# Patient Record
Sex: FEMALE | Race: BLACK OR AFRICAN AMERICAN | NOT HISPANIC OR LATINO | URBAN - METROPOLITAN AREA
[De-identification: names, ages, dates, MRNs, and addresses within clinical notes are randomized per-mention and may not be internally consistent; named-entity substitution may affect disease eponyms.]

---

## 2023-07-03 ENCOUNTER — TELEPHONE (OUTPATIENT)
Dept: PSYCHIATRY | Facility: CLINIC | Age: 28
End: 2023-07-03
Payer: COMMERCIAL

## 2023-07-03 NOTE — TELEPHONE ENCOUNTER
----- Message from Courtney Dey sent at 7/3/2023  4:05 PM CDT -----  Contact: Luis Storey is calling to speak with the department regarding scheduling new patient appointment. Please give a call back at .[phone] as requested,  Thanks  LR

## 2023-07-05 ENCOUNTER — OFFICE VISIT (OUTPATIENT)
Dept: INTERNAL MEDICINE | Facility: CLINIC | Age: 28
End: 2023-07-05
Payer: COMMERCIAL

## 2023-07-05 ENCOUNTER — LAB VISIT (OUTPATIENT)
Dept: LAB | Facility: HOSPITAL | Age: 28
End: 2023-07-05
Payer: COMMERCIAL

## 2023-07-05 VITALS
WEIGHT: 266.75 LBS | TEMPERATURE: 98 F | HEIGHT: 63 IN | BODY MASS INDEX: 47.27 KG/M2 | DIASTOLIC BLOOD PRESSURE: 80 MMHG | HEART RATE: 79 BPM | OXYGEN SATURATION: 97 % | SYSTOLIC BLOOD PRESSURE: 122 MMHG

## 2023-07-05 DIAGNOSIS — F32.A DEPRESSION, UNSPECIFIED DEPRESSION TYPE: ICD-10-CM

## 2023-07-05 DIAGNOSIS — E66.01 MORBID OBESITY WITH BMI OF 45.0-49.9, ADULT: ICD-10-CM

## 2023-07-05 DIAGNOSIS — Z13.31 POSITIVE DEPRESSION SCREENING: ICD-10-CM

## 2023-07-05 DIAGNOSIS — Z00.00 ANNUAL PHYSICAL EXAM: Primary | ICD-10-CM

## 2023-07-05 DIAGNOSIS — J45.909 MILD ASTHMA, UNSPECIFIED WHETHER COMPLICATED, UNSPECIFIED WHETHER PERSISTENT: ICD-10-CM

## 2023-07-05 DIAGNOSIS — Z00.00 ANNUAL PHYSICAL EXAM: ICD-10-CM

## 2023-07-05 LAB
ALBUMIN SERPL BCP-MCNC: 3.6 G/DL (ref 3.5–5.2)
ALP SERPL-CCNC: 92 U/L (ref 55–135)
ALT SERPL W/O P-5'-P-CCNC: 26 U/L (ref 10–44)
ANION GAP SERPL CALC-SCNC: 10 MMOL/L (ref 8–16)
AST SERPL-CCNC: 20 U/L (ref 10–40)
BASOPHILS # BLD AUTO: 0.05 K/UL (ref 0–0.2)
BASOPHILS NFR BLD: 0.4 % (ref 0–1.9)
BILIRUB SERPL-MCNC: 0.2 MG/DL (ref 0.1–1)
BUN SERPL-MCNC: 10 MG/DL (ref 6–20)
CALCIUM SERPL-MCNC: 9.4 MG/DL (ref 8.7–10.5)
CHLORIDE SERPL-SCNC: 105 MMOL/L (ref 95–110)
CHOLEST SERPL-MCNC: 181 MG/DL (ref 120–199)
CHOLEST/HDLC SERPL: 5.5 {RATIO} (ref 2–5)
CO2 SERPL-SCNC: 25 MMOL/L (ref 23–29)
CREAT SERPL-MCNC: 0.9 MG/DL (ref 0.5–1.4)
DIFFERENTIAL METHOD: ABNORMAL
EOSINOPHIL # BLD AUTO: 0.1 K/UL (ref 0–0.5)
EOSINOPHIL NFR BLD: 0.4 % (ref 0–8)
ERYTHROCYTE [DISTWIDTH] IN BLOOD BY AUTOMATED COUNT: 17.8 % (ref 11.5–14.5)
EST. GFR  (NO RACE VARIABLE): >60 ML/MIN/1.73 M^2
ESTIMATED AVG GLUCOSE: 114 MG/DL (ref 68–131)
GLUCOSE SERPL-MCNC: 92 MG/DL (ref 70–110)
HBA1C MFR BLD: 5.6 % (ref 4–5.6)
HCT VFR BLD AUTO: 30.1 % (ref 37–48.5)
HDLC SERPL-MCNC: 33 MG/DL (ref 40–75)
HDLC SERPL: 18.2 % (ref 20–50)
HGB BLD-MCNC: 8.5 G/DL (ref 12–16)
IMM GRANULOCYTES # BLD AUTO: 0.04 K/UL (ref 0–0.04)
IMM GRANULOCYTES NFR BLD AUTO: 0.4 % (ref 0–0.5)
LDLC SERPL CALC-MCNC: 121.8 MG/DL (ref 63–159)
LYMPHOCYTES # BLD AUTO: 2.7 K/UL (ref 1–4.8)
LYMPHOCYTES NFR BLD: 24.3 % (ref 18–48)
MCH RBC QN AUTO: 19.8 PG (ref 27–31)
MCHC RBC AUTO-ENTMCNC: 28.2 G/DL (ref 32–36)
MCV RBC AUTO: 70 FL (ref 82–98)
MONOCYTES # BLD AUTO: 0.6 K/UL (ref 0.3–1)
MONOCYTES NFR BLD: 5.3 % (ref 4–15)
NEUTROPHILS # BLD AUTO: 7.8 K/UL (ref 1.8–7.7)
NEUTROPHILS NFR BLD: 69.2 % (ref 38–73)
NONHDLC SERPL-MCNC: 148 MG/DL
NRBC BLD-RTO: 0 /100 WBC
PLATELET # BLD AUTO: 502 K/UL (ref 150–450)
PMV BLD AUTO: 9.8 FL (ref 9.2–12.9)
POTASSIUM SERPL-SCNC: 3.7 MMOL/L (ref 3.5–5.1)
PROT SERPL-MCNC: 7.4 G/DL (ref 6–8.4)
RBC # BLD AUTO: 4.3 M/UL (ref 4–5.4)
SODIUM SERPL-SCNC: 140 MMOL/L (ref 136–145)
TRIGL SERPL-MCNC: 131 MG/DL (ref 30–150)
TSH SERPL DL<=0.005 MIU/L-ACNC: 1.54 UIU/ML (ref 0.4–4)
WBC # BLD AUTO: 11.28 K/UL (ref 3.9–12.7)

## 2023-07-05 PROCEDURE — 84443 ASSAY THYROID STIM HORMONE: CPT

## 2023-07-05 PROCEDURE — 3008F PR BODY MASS INDEX (BMI) DOCUMENTED: ICD-10-PCS | Mod: CPTII,S$GLB,,

## 2023-07-05 PROCEDURE — 99999 PR PBB SHADOW E&M-EST. PATIENT-LVL IV: CPT | Mod: PBBFAC,,,

## 2023-07-05 PROCEDURE — 1159F MED LIST DOCD IN RCRD: CPT | Mod: CPTII,S$GLB,,

## 2023-07-05 PROCEDURE — 83036 HEMOGLOBIN GLYCOSYLATED A1C: CPT

## 2023-07-05 PROCEDURE — 3079F DIAST BP 80-89 MM HG: CPT | Mod: CPTII,S$GLB,,

## 2023-07-05 PROCEDURE — 1160F RVW MEDS BY RX/DR IN RCRD: CPT | Mod: CPTII,S$GLB,,

## 2023-07-05 PROCEDURE — 1160F PR REVIEW ALL MEDS BY PRESCRIBER/CLIN PHARMACIST DOCUMENTED: ICD-10-PCS | Mod: CPTII,S$GLB,,

## 2023-07-05 PROCEDURE — 1159F PR MEDICATION LIST DOCUMENTED IN MEDICAL RECORD: ICD-10-PCS | Mod: CPTII,S$GLB,,

## 2023-07-05 PROCEDURE — 80061 LIPID PANEL: CPT

## 2023-07-05 PROCEDURE — 99395 PREV VISIT EST AGE 18-39: CPT | Mod: S$GLB,,,

## 2023-07-05 PROCEDURE — 3079F PR MOST RECENT DIASTOLIC BLOOD PRESSURE 80-89 MM HG: ICD-10-PCS | Mod: CPTII,S$GLB,,

## 2023-07-05 PROCEDURE — 3074F SYST BP LT 130 MM HG: CPT | Mod: CPTII,S$GLB,,

## 2023-07-05 PROCEDURE — 36415 COLL VENOUS BLD VENIPUNCTURE: CPT

## 2023-07-05 PROCEDURE — 3008F BODY MASS INDEX DOCD: CPT | Mod: CPTII,S$GLB,,

## 2023-07-05 PROCEDURE — 99999 PR PBB SHADOW E&M-EST. PATIENT-LVL IV: ICD-10-PCS | Mod: PBBFAC,,,

## 2023-07-05 PROCEDURE — 3074F PR MOST RECENT SYSTOLIC BLOOD PRESSURE < 130 MM HG: ICD-10-PCS | Mod: CPTII,S$GLB,,

## 2023-07-05 PROCEDURE — 99395 PR PREVENTIVE VISIT,EST,18-39: ICD-10-PCS | Mod: S$GLB,,,

## 2023-07-05 PROCEDURE — 85025 COMPLETE CBC W/AUTO DIFF WBC: CPT

## 2023-07-05 PROCEDURE — 80053 COMPREHEN METABOLIC PANEL: CPT

## 2023-07-05 RX ORDER — BUPROPION HYDROCHLORIDE 75 MG/1
75 TABLET ORAL DAILY
Qty: 30 TABLET | Refills: 11 | Status: SHIPPED | OUTPATIENT
Start: 2023-07-05 | End: 2023-07-05

## 2023-07-05 RX ORDER — BUPROPION HYDROCHLORIDE 150 MG/1
150 TABLET ORAL DAILY
Qty: 30 TABLET | Refills: 11 | Status: SHIPPED | OUTPATIENT
Start: 2023-07-05 | End: 2023-08-04

## 2023-07-05 RX ORDER — ALBUTEROL SULFATE 90 UG/1
2 AEROSOL, METERED RESPIRATORY (INHALATION) EVERY 6 HOURS PRN
Qty: 18 G | Refills: 2 | Status: SHIPPED | OUTPATIENT
Start: 2023-07-05

## 2023-07-05 NOTE — PROGRESS NOTES
Luis Emery  07/05/2023  [id number]    Primary Doctor No  Patient Care Team:  Primary Doctor No as PCP - General          Visit Type:a scheduled routine follow-up visit    Chief Complaint:  Chief Complaint   Patient presents with    Establish Care    Referral       History of Present Illness:    The patient presents today for an establish care appointment. It was explained to the patient that I could not be their Primary Care Provider and that I will my have office staff to schedule an establish care appointment with my collaborating physician, Dr. Morena Thakur at a later time. The patient was in agreement with scheduling an establish care appointment with  at later time.      Previous PCP was in [place], seeing Thais Horta   Have history of asthma, usually seasonally  Lately having some SOB while in the heat  Needs new inhaler     Would like to see psych for counseling/med management  Family history of bipolar and depression    Feeling some depression lately  Difficulty doing ADLs  Denies SI or HI thoughts     RONNI 7  score 11  PHQ9 score  12    History:  Past Medical History:   Diagnosis Date    Asthma      Past Surgical History:   Procedure Laterality Date    WISDOM TOOTH EXTRACTION  2015     Family History   Problem Relation Age of Onset    Hypertension Mother     Bipolar disorder Mother     Hypertension Father     Hyperlipidemia Father     Diabetes Father      Social History     Socioeconomic History    Marital status: Single   Tobacco Use    Smoking status: Never     Passive exposure: Past    Smokeless tobacco: Never   Substance and Sexual Activity    Alcohol use: Yes    Drug use: Never    Sexual activity: Not Currently     There is no problem list on file for this patient.    Review of patient's allergies indicates:  No Known Allergies    The following were reviewed at this visit: active problem list, medication list, allergies, family history, social history, and health  maintenance.    Medications:  Current Outpatient Medications on File Prior to Visit   Medication Sig Dispense Refill    ALBUTEROL INHL        No current facility-administered medications on file prior to visit.       Medications have been reviewed and reconciled with patient at this visit.  Barriers to medications reviewed with patient.    Adverse reactions to current medications reviewed with patient..    Over the counter medications reviewed and reconciled with patient.    Exam:  Wt Readings from Last 3 Encounters:   07/05/23 121 kg (266 lb 12.1 oz)     Temp Readings from Last 3 Encounters:   07/05/23 97.9 °F (36.6 °C) (Tympanic)     BP Readings from Last 3 Encounters:   07/05/23 122/80     Pulse Readings from Last 3 Encounters:   07/05/23 79     Body mass index is 47.25 kg/m².      Review of Systems   Constitutional:  Negative for fever.   HENT:  Positive for sore throat. Negative for ear pain.    Respiratory:  Positive for shortness of breath. Negative for hemoptysis, sputum production and wheezing.    Cardiovascular:  Negative for chest pain, palpitations, orthopnea, claudication, leg swelling and PND.   Gastrointestinal:  Negative for abdominal pain and vomiting.   Musculoskeletal:  Negative for neck pain.   Skin:  Negative for rash.   Neurological:  Positive for headaches.   Psychiatric/Behavioral:  Positive for depression.      Answers submitted by the patient for this visit:  Shortness of Breath Questionnaire (Submitted on 7/3/2023)  Chief Complaint: Shortness of breath  Chronicity: recurrent  Onset: in the past 7 days  Frequency: intermittently  Progression since onset: waxing and waning  Episode duration: 1 Hours  coryza: No  leg pain: No  rhinorrhea: No  swollen glands: No  syncope: No  Aggravating factors: occupational exposure  Risk factors for DVT/PE: no known risk factors  asthma: Yes  allergies: Yes      Physical Exam  Vitals and nursing note reviewed.   Constitutional:       General: She is not in  acute distress.     Appearance: She is well-developed. She is morbidly obese. She is not diaphoretic.   HENT:      Head: Normocephalic and atraumatic.      Right Ear: Tympanic membrane and external ear normal.      Left Ear: Tympanic membrane and external ear normal.      Nose: Nose normal.   Eyes:      General:         Right eye: No discharge.         Left eye: No discharge.      Conjunctiva/sclera: Conjunctivae normal.      Pupils: Pupils are equal, round, and reactive to light.   Neck:      Thyroid: No thyromegaly.   Cardiovascular:      Rate and Rhythm: Normal rate and regular rhythm.      Heart sounds: Normal heart sounds. No murmur heard.  Pulmonary:      Effort: Pulmonary effort is normal. No respiratory distress.      Breath sounds: Normal breath sounds. No wheezing.   Abdominal:      General: Bowel sounds are normal.   Neurological:      Mental Status: She is alert and oriented to person, place, and time.   Psychiatric:         Behavior: Behavior normal.         Thought Content: Thought content normal.         Judgment: Judgment normal.       Laboratory Reviewed ({N/A)  No results found for: WBC, HGB, HCT, PLT, CHOL, TRIG, HDL, LDLDIRECT, ALT, AST, NA, K, CL, CREATININE, BUN, CO2, TSH, PSA, INR, GLUF, HGBA1C, MICROALBUR  Luis was seen today for establish care and referral.    Diagnoses and all orders for this visit:    Annual physical exam  -     CBC Auto Differential; Future  -     TSH; Future  -     Lipid Panel; Future  -     Comprehensive Metabolic Panel; Future  -     HEMOGLOBIN A1C; Future    Mild asthma, unspecified whether complicated, unspecified whether persistent  -     albuterol (PROAIR HFA) 90 mcg/actuation inhaler; Inhale 2 puffs into the lungs every 6 (six) hours as needed for Wheezing or Shortness of Breath. Rescue    Morbid obesity with BMI of 45.0-49.9, adult  -     CBC Auto Differential; Future  -     TSH; Future  -     Lipid Panel; Future  -     Comprehensive Metabolic Panel; Future  -      HEMOGLOBIN A1C; Future    Positive depression screening  -     Ambulatory referral/consult to Psychiatry; Future  -     Discontinue: buPROPion (WELLBUTRIN) 75 MG tablet; Take 1 tablet (75 mg total) by mouth once daily.  -     buPROPion (WELLBUTRIN XL) 150 MG TB24 tablet; Take 1 tablet (150 mg total) by mouth once daily.    Depression, unspecified depression type  -     buPROPion (WELLBUTRIN XL) 150 MG TB24 tablet; Take 1 tablet (150 mg total) by mouth once daily.    Labs today  Med check in 4-6  I placed an extremal ref to psych for counseling. Pt will call and let us know where she wants ref sent too     Common side effects are nausea, abdominal cramping, headache and dizziness but usually resolve within 1-2 weeks. Max effect of medication usually takes 4-6 weeks.     Black box warning on this medication:   If you start feeling suicidal or that you feel you want to harm yourself, please stop medication and go to your nearest emergency room for evaluation.        Would like before seeing GYN at this time       Care Plan/Goals: Reviewed    Goals    None         Follow up: No follow-ups on file.    After visit summary was printed and given to patient upon discharge today.  Patient goals and care plan are included in After Visit Summary.

## 2023-07-06 DIAGNOSIS — D64.9 ANEMIA, UNSPECIFIED TYPE: Primary | ICD-10-CM

## 2023-07-06 RX ORDER — FERROUS SULFATE 325(65) MG
325 TABLET ORAL
Qty: 90 TABLET | Refills: 3 | Status: SHIPPED | OUTPATIENT
Start: 2023-07-06

## 2023-07-07 ENCOUNTER — PATIENT MESSAGE (OUTPATIENT)
Dept: INTERNAL MEDICINE | Facility: CLINIC | Age: 28
End: 2023-07-07
Payer: COMMERCIAL

## 2023-07-24 ENCOUNTER — PATIENT MESSAGE (OUTPATIENT)
Dept: RESEARCH | Facility: HOSPITAL | Age: 28
End: 2023-07-24
Payer: COMMERCIAL

## 2023-08-04 ENCOUNTER — OFFICE VISIT (OUTPATIENT)
Dept: INTERNAL MEDICINE | Facility: CLINIC | Age: 28
End: 2023-08-04
Payer: COMMERCIAL

## 2023-08-04 ENCOUNTER — LAB VISIT (OUTPATIENT)
Dept: LAB | Facility: HOSPITAL | Age: 28
End: 2023-08-04
Payer: COMMERCIAL

## 2023-08-04 VITALS
OXYGEN SATURATION: 96 % | BODY MASS INDEX: 46.8 KG/M2 | SYSTOLIC BLOOD PRESSURE: 124 MMHG | HEART RATE: 66 BPM | HEIGHT: 63 IN | WEIGHT: 264.13 LBS | TEMPERATURE: 98 F | DIASTOLIC BLOOD PRESSURE: 80 MMHG

## 2023-08-04 DIAGNOSIS — D64.9 ANEMIA, UNSPECIFIED TYPE: ICD-10-CM

## 2023-08-04 DIAGNOSIS — F32.A DEPRESSION, UNSPECIFIED DEPRESSION TYPE: Primary | ICD-10-CM

## 2023-08-04 DIAGNOSIS — E66.01 MORBID OBESITY WITH BMI OF 45.0-49.9, ADULT: ICD-10-CM

## 2023-08-04 PROBLEM — G43.909 MIGRAINE: Status: ACTIVE | Noted: 2019-03-25

## 2023-08-04 PROBLEM — J45.909 ASTHMA: Status: ACTIVE | Noted: 2023-08-04

## 2023-08-04 LAB
BASOPHILS # BLD AUTO: 0.05 K/UL (ref 0–0.2)
BASOPHILS NFR BLD: 0.5 % (ref 0–1.9)
DIFFERENTIAL METHOD: ABNORMAL
EOSINOPHIL # BLD AUTO: 0.1 K/UL (ref 0–0.5)
EOSINOPHIL NFR BLD: 0.8 % (ref 0–8)
ERYTHROCYTE [DISTWIDTH] IN BLOOD BY AUTOMATED COUNT: 18.6 % (ref 11.5–14.5)
FERRITIN SERPL-MCNC: 14 NG/ML (ref 20–300)
HCT VFR BLD AUTO: 33.3 % (ref 37–48.5)
HGB BLD-MCNC: 9.3 G/DL (ref 12–16)
IMM GRANULOCYTES # BLD AUTO: 0.03 K/UL (ref 0–0.04)
IMM GRANULOCYTES NFR BLD AUTO: 0.3 % (ref 0–0.5)
IRON SERPL-MCNC: 19 UG/DL (ref 30–160)
LYMPHOCYTES # BLD AUTO: 2.5 K/UL (ref 1–4.8)
LYMPHOCYTES NFR BLD: 23.8 % (ref 18–48)
MCH RBC QN AUTO: 20 PG (ref 27–31)
MCHC RBC AUTO-ENTMCNC: 27.9 G/DL (ref 32–36)
MCV RBC AUTO: 72 FL (ref 82–98)
MONOCYTES # BLD AUTO: 0.7 K/UL (ref 0.3–1)
MONOCYTES NFR BLD: 6.8 % (ref 4–15)
NEUTROPHILS # BLD AUTO: 7 K/UL (ref 1.8–7.7)
NEUTROPHILS NFR BLD: 67.8 % (ref 38–73)
NRBC BLD-RTO: 0 /100 WBC
PLATELET # BLD AUTO: 533 K/UL (ref 150–450)
PMV BLD AUTO: 9.8 FL (ref 9.2–12.9)
RBC # BLD AUTO: 4.64 M/UL (ref 4–5.4)
SATURATED IRON: 5 % (ref 20–50)
TOTAL IRON BINDING CAPACITY: 395 UG/DL (ref 250–450)
TRANSFERRIN SERPL-MCNC: 267 MG/DL (ref 200–375)
WBC # BLD AUTO: 10.32 K/UL (ref 3.9–12.7)

## 2023-08-04 PROCEDURE — 84466 ASSAY OF TRANSFERRIN: CPT

## 2023-08-04 PROCEDURE — 3044F PR MOST RECENT HEMOGLOBIN A1C LEVEL <7.0%: ICD-10-PCS | Mod: CPTII,S$GLB,,

## 2023-08-04 PROCEDURE — 3074F SYST BP LT 130 MM HG: CPT | Mod: CPTII,S$GLB,,

## 2023-08-04 PROCEDURE — 99999 PR PBB SHADOW E&M-EST. PATIENT-LVL IV: ICD-10-PCS | Mod: PBBFAC,,,

## 2023-08-04 PROCEDURE — 3008F PR BODY MASS INDEX (BMI) DOCUMENTED: ICD-10-PCS | Mod: CPTII,S$GLB,,

## 2023-08-04 PROCEDURE — 1159F MED LIST DOCD IN RCRD: CPT | Mod: CPTII,S$GLB,,

## 2023-08-04 PROCEDURE — 82728 ASSAY OF FERRITIN: CPT

## 2023-08-04 PROCEDURE — 99214 OFFICE O/P EST MOD 30 MIN: CPT | Mod: S$GLB,,,

## 2023-08-04 PROCEDURE — 3044F HG A1C LEVEL LT 7.0%: CPT | Mod: CPTII,S$GLB,,

## 2023-08-04 PROCEDURE — 3074F PR MOST RECENT SYSTOLIC BLOOD PRESSURE < 130 MM HG: ICD-10-PCS | Mod: CPTII,S$GLB,,

## 2023-08-04 PROCEDURE — 85025 COMPLETE CBC W/AUTO DIFF WBC: CPT

## 2023-08-04 PROCEDURE — 99999 PR PBB SHADOW E&M-EST. PATIENT-LVL IV: CPT | Mod: PBBFAC,,,

## 2023-08-04 PROCEDURE — 3079F PR MOST RECENT DIASTOLIC BLOOD PRESSURE 80-89 MM HG: ICD-10-PCS | Mod: CPTII,S$GLB,,

## 2023-08-04 PROCEDURE — 3008F BODY MASS INDEX DOCD: CPT | Mod: CPTII,S$GLB,,

## 2023-08-04 PROCEDURE — 1160F RVW MEDS BY RX/DR IN RCRD: CPT | Mod: CPTII,S$GLB,,

## 2023-08-04 PROCEDURE — 3079F DIAST BP 80-89 MM HG: CPT | Mod: CPTII,S$GLB,,

## 2023-08-04 PROCEDURE — 1160F PR REVIEW ALL MEDS BY PRESCRIBER/CLIN PHARMACIST DOCUMENTED: ICD-10-PCS | Mod: CPTII,S$GLB,,

## 2023-08-04 PROCEDURE — 36415 COLL VENOUS BLD VENIPUNCTURE: CPT

## 2023-08-04 PROCEDURE — 1159F PR MEDICATION LIST DOCUMENTED IN MEDICAL RECORD: ICD-10-PCS | Mod: CPTII,S$GLB,,

## 2023-08-04 PROCEDURE — 99214 PR OFFICE/OUTPT VISIT, EST, LEVL IV, 30-39 MIN: ICD-10-PCS | Mod: S$GLB,,,

## 2023-08-04 RX ORDER — BUPROPION HYDROCHLORIDE 75 MG/1
TABLET ORAL
COMMUNITY
Start: 2023-07-05 | End: 2023-08-04

## 2023-08-04 RX ORDER — CITALOPRAM 10 MG/1
10 TABLET ORAL DAILY
Qty: 30 TABLET | Refills: 11 | Status: SHIPPED | OUTPATIENT
Start: 2023-08-04 | End: 2024-08-03

## 2023-08-04 NOTE — PROGRESS NOTES
Luis Land  08/04/2023  [id number]    Morena Thakur MD  Patient Care Team:  Morena Thakur MD as PCP - General (Family Medicine)          Visit Type:a scheduled routine follow-up visit    Chief Complaint:  Chief Complaint   Patient presents with    Follow-up     Medication check        History of Present Illness:    Last visit had positive depression screen  Started on Wellbutrin 150 mg   Feels that the Wellbutrin makes her tired   Has not noticed a difference in anxiety and depression   Missed one week of taking the medication     Labs noted severe anemia  Started on daily iron   Does not have heavy cycle  Does have blood clots at times  Cycle usually last 3 days  Does not bleed through tampons /pads  Has not noticed blood in urine and stool     History:  Past Medical History:   Diagnosis Date    Asthma      Past Surgical History:   Procedure Laterality Date    WISDOM TOOTH EXTRACTION  2015     Family History   Problem Relation Age of Onset    Hypertension Mother     Bipolar disorder Mother     Hypertension Father     Hyperlipidemia Father     Diabetes Father      Social History     Socioeconomic History    Marital status: Single   Tobacco Use    Smoking status: Never     Passive exposure: Past    Smokeless tobacco: Never   Substance and Sexual Activity    Alcohol use: Yes    Drug use: Never    Sexual activity: Not Currently     There is no problem list on file for this patient.    Review of patient's allergies indicates:  No Known Allergies    The following were reviewed at this visit: active problem list, medication list, allergies, family history, social history, and health maintenance.    Medications:  Current Outpatient Medications on File Prior to Visit   Medication Sig Dispense Refill    albuterol (PROAIR HFA) 90 mcg/actuation inhaler Inhale 2 puffs into the lungs every 6 (six) hours as needed for Wheezing or Shortness of Breath. Rescue 18 g 2    ALBUTEROL INHL       buPROPion (WELLBUTRIN XL) 150  MG TB24 tablet Take 1 tablet (150 mg total) by mouth once daily. 30 tablet 11    ferrous sulfate (IRON) 325 mg (65 mg iron) Tab tablet Take 1 tablet (325 mg total) by mouth daily with breakfast. 90 tablet 3     No current facility-administered medications on file prior to visit.       Medications have been reviewed and reconciled with patient at this visit.  Barriers to medications reviewed with patient.    Adverse reactions to current medications reviewed with patient..    Over the counter medications reviewed and reconciled with patient.    Exam:  Wt Readings from Last 3 Encounters:   07/05/23 121 kg (266 lb 12.1 oz)     Temp Readings from Last 3 Encounters:   07/05/23 97.9 °F (36.6 °C) (Tympanic)     BP Readings from Last 3 Encounters:   07/05/23 122/80     Pulse Readings from Last 3 Encounters:   07/05/23 79     There is no height or weight on file to calculate BMI.      Review of Systems   Constitutional:  Positive for malaise/fatigue.   Respiratory:  Negative for shortness of breath.    Cardiovascular:  Negative for chest pain and palpitations.   Psychiatric/Behavioral:  The patient is nervous/anxious.      Physical Exam  Vitals and nursing note reviewed.   Constitutional:       General: She is not in acute distress.     Appearance: She is well-developed. She is morbidly obese. She is not diaphoretic.   HENT:      Head: Normocephalic and atraumatic.      Right Ear: External ear normal.      Left Ear: External ear normal.      Nose: Nose normal.   Eyes:      General:         Right eye: No discharge.         Left eye: No discharge.      Conjunctiva/sclera: Conjunctivae normal.      Pupils: Pupils are equal, round, and reactive to light.   Neck:      Thyroid: No thyromegaly.   Cardiovascular:      Rate and Rhythm: Normal rate and regular rhythm.      Heart sounds: Normal heart sounds. No murmur heard.  Pulmonary:      Effort: Pulmonary effort is normal. No respiratory distress.      Breath sounds: Normal breath  sounds. No wheezing.   Neurological:      Mental Status: She is alert and oriented to person, place, and time.   Psychiatric:         Behavior: Behavior normal.         Thought Content: Thought content normal.         Judgment: Judgment normal.         Laboratory Reviewed ({Yes)  Lab Results   Component Value Date    WBC 11.28 07/05/2023    HGB 8.5 (L) 07/05/2023    HCT 30.1 (L) 07/05/2023     (H) 07/05/2023    CHOL 181 07/05/2023    TRIG 131 07/05/2023    HDL 33 (L) 07/05/2023    ALT 26 07/05/2023    AST 20 07/05/2023     07/05/2023    K 3.7 07/05/2023     07/05/2023    CREATININE 0.9 07/05/2023    BUN 10 07/05/2023    CO2 25 07/05/2023    TSH 1.544 07/05/2023    HGBA1C 5.6 07/05/2023         Luis was seen today for follow-up.    Diagnoses and all orders for this visit:    Depression, unspecified depression type  -     citalopram (CELEXA) 10 MG tablet; Take 1 tablet (10 mg total) by mouth once daily.    Morbid obesity with BMI of 45.0-49.9, adult  Encouraged healthy diet and exercise as tolerated to help bring BMI into normal range.      Anemia, unspecified type  -     CBC Auto Differential; Future  -     Iron and TIBC; Future  -     Ferritin; Future      Stop Wellbutrin  Start Celexa    Common side effects are nausea, abdominal cramping, headache and dizziness but usually resolve within 1-2 weeks. Max effect of medication usually takes 4-6 weeks.     Black box warning on this medication:   If you start feeling suicidal or that you feel you want to harm yourself, please stop medication and go to your nearest emergency room for evaluation.      Will schedule a medicine check in 4-6 weeks        Care Plan/Goals: Reviewed    Goals    None         Follow up: No follow-ups on file.    After visit summary was printed and given to patient upon discharge today.  Patient goals and care plan are included in After Visit Summary.

## 2023-08-13 ENCOUNTER — PATIENT MESSAGE (OUTPATIENT)
Dept: INTERNAL MEDICINE | Facility: CLINIC | Age: 28
End: 2023-08-13
Payer: COMMERCIAL

## 2023-08-14 DIAGNOSIS — D64.9 ANEMIA, UNSPECIFIED TYPE: Primary | ICD-10-CM

## 2023-08-18 ENCOUNTER — TELEPHONE (OUTPATIENT)
Dept: HEMATOLOGY/ONCOLOGY | Facility: CLINIC | Age: 28
End: 2023-08-18
Payer: COMMERCIAL

## 2023-08-18 DIAGNOSIS — D75.839 THROMBOCYTOSIS: ICD-10-CM

## 2023-08-18 DIAGNOSIS — D50.9 IDA (IRON DEFICIENCY ANEMIA): Primary | ICD-10-CM

## 2023-08-18 NOTE — TELEPHONE ENCOUNTER
Spoke to patient in reference to Hematology referral from Carey Kam NP.  Appointment scheduled per patient's request next available Thursday or Friday at the [facility].  Appointment notice via pt portal.

## 2023-08-21 ENCOUNTER — PATIENT MESSAGE (OUTPATIENT)
Dept: RESEARCH | Facility: HOSPITAL | Age: 28
End: 2023-08-21
Payer: COMMERCIAL

## 2024-04-09 ENCOUNTER — OFFICE VISIT (OUTPATIENT)
Dept: INTERNAL MEDICINE | Facility: CLINIC | Age: 29
End: 2024-04-09
Payer: COMMERCIAL

## 2024-04-09 DIAGNOSIS — F32.A DEPRESSION, UNSPECIFIED DEPRESSION TYPE: ICD-10-CM

## 2024-04-09 DIAGNOSIS — F41.9 ANXIETY AND DEPRESSION: Primary | ICD-10-CM

## 2024-04-09 DIAGNOSIS — F32.A ANXIETY AND DEPRESSION: Primary | ICD-10-CM

## 2024-04-09 DIAGNOSIS — D64.9 ANEMIA, UNSPECIFIED TYPE: ICD-10-CM

## 2024-04-09 PROCEDURE — 99213 OFFICE O/P EST LOW 20 MIN: CPT | Mod: 95,,,

## 2024-04-09 RX ORDER — CITALOPRAM 10 MG/1
10 TABLET ORAL DAILY
Qty: 30 TABLET | Refills: 11 | Status: SHIPPED | OUTPATIENT
Start: 2024-04-09 | End: 2025-04-09

## 2024-04-09 RX ORDER — FERROUS SULFATE 325(65) MG
325 TABLET ORAL
Qty: 90 TABLET | Refills: 3 | Status: SHIPPED | OUTPATIENT
Start: 2024-04-09

## 2024-04-09 NOTE — PROGRESS NOTES
Luis Emery  04/09/2024  [id number]    No primary care provider on file.  Patient Care Team:  Carey Kam, NP as Nurse Practitioner (Internal Medicine)          Visit Type:an urgent visit for an existing problem     The patient location is: [place]   The chief complaint leading to consultation is: Med Refill    Visit type: audiovisual    Face to Face time with patient:   18 minutes of total time spent on the encounter, which includes face to face time and non-face to face time preparing to see the patient (eg, review of tests), Obtaining and/or reviewing separately obtained history, Documenting clinical information in the electronic or other health record, Independently interpreting results (not separately reported) and communicating results to the patient/family/caregiver, or Care coordination (not separately reported).         Each patient to whom he or she provides medical services by telemedicine is:  (1) informed of the relationship between the physician and patient and the respective role of any other health care provider with respect to management of the patient; and (2) notified that he or she may decline to receive medical services by telemedicine and may withdraw from such care at any time.    Notes:      She has not been taking her medications    History of anxiety and depression  Tried wellbutrin. Did not like the SE  Started on celexa. Plan was to do med check in 4-6 weeks   Her grandmother passed away  Took the Celexa for a week      History:  Past Medical History:   Diagnosis Date    Asthma      Past Surgical History:   Procedure Laterality Date    WISDOM TOOTH EXTRACTION  2015     Family History   Problem Relation Age of Onset    Hypertension Mother     Bipolar disorder Mother     Hypertension Father     Hyperlipidemia Father     Diabetes Father      Social History     Socioeconomic History    Marital status: Single   Tobacco Use    Smoking status: Never     Passive exposure: Past    Smokeless  tobacco: Never   Substance and Sexual Activity    Alcohol use: Yes    Drug use: Never    Sexual activity: Not Currently     Social Determinants of Health     Financial Resource Strain: Low Risk  (4/3/2024)    Overall Financial Resource Strain (CARDIA)     Difficulty of Paying Living Expenses: Not very hard   Food Insecurity: Patient Declined (4/3/2024)    Hunger Vital Sign     Worried About Running Out of Food in the Last Year: Patient declined     Ran Out of Food in the Last Year: Patient declined   Transportation Needs: Unknown (4/3/2024)    PRAPARE - Transportation     Lack of Transportation (Medical): Patient declined     Lack of Transportation (Non-Medical): No   Physical Activity: Unknown (4/3/2024)    Exercise Vital Sign     Days of Exercise per Week: 0 days   Stress: Stress Concern Present (4/3/2024)    [employer] - Occupational Stress Questionnaire     Feeling of Stress : Rather much   Social Connections: Unknown (4/3/2024)    Social Connection and Isolation Panel [NHANES]     Frequency of Communication with Friends and Family: More than three times a week     Frequency of Social Gatherings with Friends and Family: Once a week     Active Member of Clubs or Organizations: No     Marital Status: Never    Housing Stability: Unknown (4/3/2024)    Housing Stability Vital Sign     Unable to Pay for Housing in the Last Year: No     Unstable Housing in the Last Year: No     Patient Active Problem List   Diagnosis    Asthma    Migraine     Review of patient's allergies indicates:  No Known Allergies    The following were reviewed at this visit: active problem list, medication list, allergies, family history, social history, and health maintenance.    Medications:  Current Outpatient Medications on File Prior to Visit   Medication Sig Dispense Refill    albuterol (PROAIR HFA) 90 mcg/actuation inhaler Inhale 2 puffs into the lungs every 6 (six) hours as needed for Wheezing or  Shortness of Breath. Rescue 18 g 2    ALBUTEROL INHL       citalopram (CELEXA) 10 MG tablet Take 1 tablet (10 mg total) by mouth once daily. 30 tablet 11    ferrous sulfate (IRON) 325 mg (65 mg iron) Tab tablet Take 1 tablet (325 mg total) by mouth daily with breakfast. 90 tablet 3     No current facility-administered medications on file prior to visit.       Medications have been reviewed and reconciled with patient at this visit.  Barriers to medications reviewed with patient.    Adverse reactions to current medications reviewed with patient..    Over the counter medications reviewed and reconciled with patient.    Exam:  Wt Readings from Last 3 Encounters:   08/04/23 119.8 kg (264 lb 1.8 oz)   07/05/23 121 kg (266 lb 12.1 oz)     Temp Readings from Last 3 Encounters:   08/04/23 98.2 °F (36.8 °C) (Tympanic)   07/05/23 97.9 °F (36.6 °C) (Tympanic)     BP Readings from Last 3 Encounters:   08/04/23 124/80   07/05/23 122/80     Pulse Readings from Last 3 Encounters:   08/04/23 66   07/05/23 79     There is no height or weight on file to calculate BMI.      Review of Systems   HENT:  Negative for hearing loss.    Eyes:  Negative for discharge.   Respiratory:  Negative for wheezing.    Cardiovascular:  Negative for chest pain and palpitations.   Gastrointestinal:  Negative for blood in stool, constipation, diarrhea and vomiting.   Genitourinary:  Negative for dysuria and hematuria.   Musculoskeletal:  Positive for neck pain.   Neurological:  Positive for headaches. Negative for weakness.   Endo/Heme/Allergies:  Negative for polydipsia.     Answers submitted by the patient for this visit:  Review of Systems Questionnaire (Submitted on 4/3/2024)  activity change: No  unexpected weight change: No  rhinorrhea: No  trouble swallowing: No  visual disturbance: No  chest tightness: No  polyuria: No  difficulty urinating: No  menstrual problem: No  joint swelling: No  arthralgias: No  confusion: No  dysphoric mood:  Yes      Physical Exam  Nursing note reviewed.   Pulmonary:      Effort: Pulmonary effort is normal. No respiratory distress.   Neurological:      Mental Status: She is alert and oriented to person, place, and time.   Psychiatric:         Mood and Affect: Mood normal.         Behavior: Behavior normal.         Thought Content: Thought content normal.         Judgment: Judgment normal.         Laboratory Reviewed ({Yes)  Lab Results   Component Value Date    WBC 10.32 08/04/2023    HGB 9.3 (L) 08/04/2023    HCT 33.3 (L) 08/04/2023     (H) 08/04/2023    CHOL 181 07/05/2023    TRIG 131 07/05/2023    HDL 33 (L) 07/05/2023    ALT 26 07/05/2023    AST 20 07/05/2023     07/05/2023    K 3.7 07/05/2023     07/05/2023    CREATININE 0.9 07/05/2023    BUN 10 07/05/2023    CO2 25 07/05/2023    TSH 1.544 07/05/2023    HGBA1C 5.6 07/05/2023       Diagnoses and all orders for this visit:    Anxiety and depression  -     citalopram (CELEXA) 10 MG tablet; Take 1 tablet (10 mg total) by mouth once daily.    Depression, unspecified depression type  -     citalopram (CELEXA) 10 MG tablet; Take 1 tablet (10 mg total) by mouth once daily.    Anemia, unspecified type  -     ferrous sulfate (IRON) 325 mg (65 mg iron) Tab tablet; Take 1 tablet (325 mg total) by mouth daily with breakfast.    Resume celexa  Discussed calling insurance company regarding counseling    Common side effects are nausea, abdominal cramping, headache and dizziness but usually resolve within 1-2 weeks. Max effect of medication usually takes 4-6 weeks.     Black box warning on this medication:   If you start feeling suicidal or that you feel you want to harm yourself, please stop medication and go to your nearest emergency room for evaluation.      Will schedule a medicine check in 4-6 weeks        Care Plan/Goals: Reviewed    Goals    None         Follow up: No follow-ups on file.    After visit summary was printed and given to patient upon  discharge today.  Patient goals and care plan are included in After Visit Summary.

## 2024-04-15 ENCOUNTER — PATIENT MESSAGE (OUTPATIENT)
Dept: INTERNAL MEDICINE | Facility: CLINIC | Age: 29
End: 2024-04-15
Payer: COMMERCIAL

## 2025-01-24 ENCOUNTER — OFFICE VISIT (OUTPATIENT)
Dept: PRIMARY CARE CLINIC | Facility: CLINIC | Age: 30
End: 2025-01-24
Payer: COMMERCIAL

## 2025-01-24 ENCOUNTER — LAB VISIT (OUTPATIENT)
Dept: LAB | Facility: HOSPITAL | Age: 30
End: 2025-01-24
Attending: INTERNAL MEDICINE
Payer: COMMERCIAL

## 2025-01-24 VITALS
HEART RATE: 80 BPM | OXYGEN SATURATION: 98 % | HEIGHT: 64 IN | SYSTOLIC BLOOD PRESSURE: 128 MMHG | WEIGHT: 266 LBS | BODY MASS INDEX: 45.41 KG/M2 | TEMPERATURE: 99 F | DIASTOLIC BLOOD PRESSURE: 78 MMHG

## 2025-01-24 DIAGNOSIS — Z00.00 ANNUAL PHYSICAL EXAM: ICD-10-CM

## 2025-01-24 DIAGNOSIS — E61.1 IRON DEFICIENCY: ICD-10-CM

## 2025-01-24 DIAGNOSIS — Z12.4 CERVICAL CANCER SCREENING: ICD-10-CM

## 2025-01-24 DIAGNOSIS — F41.9 ANXIETY: ICD-10-CM

## 2025-01-24 DIAGNOSIS — J45.909 MILD ASTHMA, UNSPECIFIED WHETHER COMPLICATED, UNSPECIFIED WHETHER PERSISTENT: ICD-10-CM

## 2025-01-24 DIAGNOSIS — Z00.00 ENCOUNTER FOR SCREENING AND PREVENTATIVE CARE: ICD-10-CM

## 2025-01-24 DIAGNOSIS — Z76.89 ESTABLISHING CARE WITH NEW DOCTOR, ENCOUNTER FOR: Primary | ICD-10-CM

## 2025-01-24 LAB
BASOPHILS # BLD AUTO: 0.08 K/UL (ref 0–0.2)
BASOPHILS NFR BLD: 0.8 % (ref 0–1.9)
DIFFERENTIAL METHOD BLD: ABNORMAL
EOSINOPHIL # BLD AUTO: 0 K/UL (ref 0–0.5)
EOSINOPHIL NFR BLD: 0.3 % (ref 0–8)
ERYTHROCYTE [DISTWIDTH] IN BLOOD BY AUTOMATED COUNT: 18.4 % (ref 11.5–14.5)
HCT VFR BLD AUTO: 31.3 % (ref 37–48.5)
HGB BLD-MCNC: 8.6 G/DL (ref 12–16)
IMM GRANULOCYTES # BLD AUTO: 0.04 K/UL (ref 0–0.04)
IMM GRANULOCYTES NFR BLD AUTO: 0.4 % (ref 0–0.5)
LYMPHOCYTES # BLD AUTO: 2.8 K/UL (ref 1–4.8)
LYMPHOCYTES NFR BLD: 27.7 % (ref 18–48)
MCH RBC QN AUTO: 18.4 PG (ref 27–31)
MCHC RBC AUTO-ENTMCNC: 27.5 G/DL (ref 32–36)
MCV RBC AUTO: 67 FL (ref 82–98)
MONOCYTES # BLD AUTO: 0.5 K/UL (ref 0.3–1)
MONOCYTES NFR BLD: 5.2 % (ref 4–15)
NEUTROPHILS # BLD AUTO: 6.7 K/UL (ref 1.8–7.7)
NEUTROPHILS NFR BLD: 65.6 % (ref 38–73)
NRBC BLD-RTO: 0 /100 WBC
PLATELET # BLD AUTO: 568 K/UL (ref 150–450)
PMV BLD AUTO: 9.6 FL (ref 9.2–12.9)
RBC # BLD AUTO: 4.68 M/UL (ref 4–5.4)
WBC # BLD AUTO: 10.24 K/UL (ref 3.9–12.7)

## 2025-01-24 PROCEDURE — 86803 HEPATITIS C AB TEST: CPT | Performed by: INTERNAL MEDICINE

## 2025-01-24 PROCEDURE — 80053 COMPREHEN METABOLIC PANEL: CPT | Performed by: INTERNAL MEDICINE

## 2025-01-24 PROCEDURE — 99395 PREV VISIT EST AGE 18-39: CPT | Mod: 1E,GY,S$GLB, | Performed by: INTERNAL MEDICINE

## 2025-01-24 PROCEDURE — 99999 PR PBB SHADOW E&M-EST. PATIENT-LVL IV: CPT | Mod: PBBFAC,,, | Performed by: INTERNAL MEDICINE

## 2025-01-24 PROCEDURE — 85025 COMPLETE CBC W/AUTO DIFF WBC: CPT | Performed by: INTERNAL MEDICINE

## 2025-01-24 PROCEDURE — 83036 HEMOGLOBIN GLYCOSYLATED A1C: CPT | Performed by: INTERNAL MEDICINE

## 2025-01-24 PROCEDURE — 99213 OFFICE O/P EST LOW 20 MIN: CPT | Mod: 25,S$GLB,, | Performed by: INTERNAL MEDICINE

## 2025-01-24 PROCEDURE — 80061 LIPID PANEL: CPT | Performed by: INTERNAL MEDICINE

## 2025-01-24 PROCEDURE — 83540 ASSAY OF IRON: CPT | Performed by: INTERNAL MEDICINE

## 2025-01-24 PROCEDURE — 36415 COLL VENOUS BLD VENIPUNCTURE: CPT | Mod: PN | Performed by: INTERNAL MEDICINE

## 2025-01-24 PROCEDURE — 82728 ASSAY OF FERRITIN: CPT | Performed by: INTERNAL MEDICINE

## 2025-01-24 PROCEDURE — 84443 ASSAY THYROID STIM HORMONE: CPT | Performed by: INTERNAL MEDICINE

## 2025-01-24 RX ORDER — ALBUTEROL SULFATE 90 UG/1
2 INHALANT RESPIRATORY (INHALATION) EVERY 6 HOURS PRN
Qty: 18 G | Refills: 2 | Status: SHIPPED | OUTPATIENT
Start: 2025-01-24

## 2025-01-24 NOTE — PROGRESS NOTES
Subjective     Patient ID: Luis Land is a 29 y.o. female.    Chief Complaint: Establish Care      HPI  here to est care/check up.  Due for labs.  Due for pap- plans to schedule.  Has been struggling with some anxiety and is interested in talking to a counselor.  No si/hi.  Hx d/w pt in detail.  H/o iron deficiency.  Regular cycles.  States discussed in past possible infusions of iron but never got to that point with previous MD.    Past Medical History:   Diagnosis Date    Asthma      Review of patient's allergies indicates:  No Known Allergies  Past Surgical History:   Procedure Laterality Date    WISDOM TOOTH EXTRACTION  2015     Family History   Problem Relation Name Age of Onset    Hypertension Mother      Bipolar disorder Mother      Hypertension Father      Hyperlipidemia Father      Diabetes Father       Social History     Socioeconomic History    Marital status: Single   Tobacco Use    Smoking status: Never     Passive exposure: Past    Smokeless tobacco: Never   Substance and Sexual Activity    Alcohol use: Yes    Drug use: Never    Sexual activity: Not Currently     Social Drivers of Health     Financial Resource Strain: Low Risk  (4/3/2024)    Overall Financial Resource Strain (CARDIA)     Difficulty of Paying Living Expenses: Not very hard   Food Insecurity: Patient Declined (4/3/2024)    Hunger Vital Sign     Worried About Running Out of Food in the Last Year: Patient declined     Ran Out of Food in the Last Year: Patient declined   Transportation Needs: Unknown (4/3/2024)    PRAPARE - Transportation     Lack of Transportation (Medical): Patient declined     Lack of Transportation (Non-Medical): No   Physical Activity: Unknown (4/3/2024)    Exercise Vital Sign     Days of Exercise per Week: 0 days   Stress: Stress Concern Present (4/3/2024)    [facility] - Occupational Stress Questionnaire     Feeling of Stress : Rather much   Housing Stability: Unknown (4/3/2024)     "Housing Stability Vital Sign     Unable to Pay for Housing in the Last Year: No     Unstable Housing in the Last Year: No         /78 (BP Location: Left arm)   Pulse 80   Temp 98.6 °F (37 °C) (Tympanic)   Ht 5' 4" (1.626 m)   Wt 120.7 kg (265 lb 15.8 oz)   LMP 01/06/2025 (Approximate)   SpO2 98%   BMI 45.66 kg/m²   No current outpatient medications on file as of 1/24/2025.     No current facility-administered medications on file as of 1/24/2025.       Review of Systems   All other systems reviewed and are negative.         Objective     Physical Exam  Constitutional:       General: She is not in acute distress.     Appearance: Normal appearance. She is not ill-appearing, toxic-appearing or diaphoretic.      Comments: Bmi 45   HENT:      Head: Normocephalic and atraumatic.      Mouth/Throat:      Mouth: Mucous membranes are moist.   Eyes:      Conjunctiva/sclera: Conjunctivae normal.   Cardiovascular:      Rate and Rhythm: Normal rate and regular rhythm.      Heart sounds: No murmur heard.     No friction rub. No gallop.   Pulmonary:      Effort: Pulmonary effort is normal. No respiratory distress.      Breath sounds: Normal breath sounds. No wheezing or rales.   Musculoskeletal:      Cervical back: Neck supple.   Skin:     General: Skin is dry.   Neurological:      General: No focal deficit present.      Mental Status: She is alert and oriented to person, place, and time.   Psychiatric:         Mood and Affect: Mood normal.         Behavior: Behavior normal.            Assessment and Plan     1. Establishing care with new doctor, encounter for    2. Mild asthma, unspecified whether complicated, unspecified whether persistent  -     albuterol (PROAIR HFA) 90 mcg/actuation inhaler; Inhale 2 puffs into the lungs every 6 (six) hours as needed for Wheezing or Shortness of Breath. Rescue  Dispense: 18 g; Refill: 2    3. Encounter for screening and preventative care  -     Lipid Panel; Future; Expected date: " 01/24/2025  -     Hemoglobin A1C; Future; Expected date: 01/24/2025  -     TSH; Future; Expected date: 01/24/2025  -     Comprehensive Metabolic Panel; Future; Expected date: 01/24/2025  -     CBC Auto Differential; Future; Expected date: 01/24/2025  -     HEPATITIS C ANTIBODY; Future; Expected date: 01/24/2025    4. Iron deficiency  -     CBC Auto Differential; Future; Expected date: 01/24/2025  -     FERRITIN; Future; Expected date: 01/24/2025  -     Iron and TIBC; Future; Expected date: 01/24/2025    5. Cervical cancer screening  -     Ambulatory referral/consult to Obstetrics / Gynecology; Future; Expected date: 01/31/2025    6. BMI 45.0-49.9, adult  Comments:  LSM; labs    7. Anxiety  -     Ambulatory referral/consult to Social Work; Future; Expected date: 01/31/2025    8. Annual physical exam       F/u pending labs and counseling appt      Immunization History   Administered Date(s) Administered    COVID-19, MRNA, LN-S, PF (Pfizer) (Purple Cap) 04/19/2021, 05/10/2021, 01/10/2022    DTP 1997, 03/02/1998, 05/08/1998, 10/30/1998    HIB 1997, 03/02/1998, 05/08/1998, 10/30/1998    HPV Quadrivalent 06/29/2009, 11/11/2009, 02/08/2011    Hepatitis A, Pediatric/Adolescent, 2 Dose 04/10/2015, 04/22/2016    Hepatitis B, Pediatric/Adolescent 1997, 1997, 05/08/1998    IPV 02/05/2002    Influenza 11/17/2005, 12/20/2006, 10/28/2009, 02/08/2011, 10/16/2012, 04/03/2014    Influenza - Quadrivalent 10/06/2015    Influenza - Quadrivalent - PF *Preferred* (6 months and older) 11/20/2014    MMR 10/30/1998, 02/05/2002    Meningococcal Conjugate (MCV4P) 12/17/2008, 04/03/2014    OPV 1997, 03/02/1998, 10/30/1998    Tdap 12/17/2008    Varicella 10/31/2007, 01/30/2008     1 year annual

## 2025-01-25 LAB
ALBUMIN SERPL BCP-MCNC: 3.6 G/DL (ref 3.5–5.2)
ALP SERPL-CCNC: 95 U/L (ref 40–150)
ALT SERPL W/O P-5'-P-CCNC: 32 U/L (ref 10–44)
ANION GAP SERPL CALC-SCNC: 10 MMOL/L (ref 8–16)
AST SERPL-CCNC: 18 U/L (ref 10–40)
BILIRUB SERPL-MCNC: 0.3 MG/DL (ref 0.1–1)
BUN SERPL-MCNC: 10 MG/DL (ref 6–20)
CALCIUM SERPL-MCNC: 8.9 MG/DL (ref 8.7–10.5)
CHLORIDE SERPL-SCNC: 106 MMOL/L (ref 95–110)
CHOLEST SERPL-MCNC: 156 MG/DL (ref 120–199)
CHOLEST/HDLC SERPL: 4.7 {RATIO} (ref 2–5)
CO2 SERPL-SCNC: 22 MMOL/L (ref 23–29)
CREAT SERPL-MCNC: 0.7 MG/DL (ref 0.5–1.4)
EST. GFR  (NO RACE VARIABLE): >60 ML/MIN/1.73 M^2
ESTIMATED AVG GLUCOSE: 114 MG/DL (ref 68–131)
FERRITIN SERPL-MCNC: 9 NG/ML (ref 20–300)
GLUCOSE SERPL-MCNC: 76 MG/DL (ref 70–110)
HBA1C MFR BLD: 5.6 % (ref 4–5.6)
HCV AB SERPL QL IA: NORMAL
HDLC SERPL-MCNC: 33 MG/DL (ref 40–75)
HDLC SERPL: 21.2 % (ref 20–50)
IRON SERPL-MCNC: 16 UG/DL (ref 30–160)
LDLC SERPL CALC-MCNC: 105.2 MG/DL (ref 63–159)
NONHDLC SERPL-MCNC: 123 MG/DL
POTASSIUM SERPL-SCNC: 4.1 MMOL/L (ref 3.5–5.1)
PROT SERPL-MCNC: 7.9 G/DL (ref 6–8.4)
SATURATED IRON: 4 % (ref 20–50)
SODIUM SERPL-SCNC: 138 MMOL/L (ref 136–145)
TOTAL IRON BINDING CAPACITY: 417 UG/DL (ref 250–450)
TRANSFERRIN SERPL-MCNC: 282 MG/DL (ref 200–375)
TRIGL SERPL-MCNC: 89 MG/DL (ref 30–150)
TSH SERPL DL<=0.005 MIU/L-ACNC: 2.12 UIU/ML (ref 0.4–4)

## 2025-01-27 ENCOUNTER — PATIENT MESSAGE (OUTPATIENT)
Dept: HEMATOLOGY/ONCOLOGY | Facility: CLINIC | Age: 30
End: 2025-01-27
Payer: COMMERCIAL

## 2025-01-27 DIAGNOSIS — D50.9 IRON DEFICIENCY ANEMIA, UNSPECIFIED IRON DEFICIENCY ANEMIA TYPE: Primary | ICD-10-CM

## 2025-01-27 DIAGNOSIS — R79.0 LOW FERRITIN: ICD-10-CM

## 2025-01-28 DIAGNOSIS — D50.9 IDA (IRON DEFICIENCY ANEMIA): Primary | ICD-10-CM

## 2025-01-28 DIAGNOSIS — D75.839 THROMBOCYTOSIS: ICD-10-CM
